# Patient Record
Sex: MALE | Race: WHITE | NOT HISPANIC OR LATINO | URBAN - METROPOLITAN AREA
[De-identification: names, ages, dates, MRNs, and addresses within clinical notes are randomized per-mention and may not be internally consistent; named-entity substitution may affect disease eponyms.]

---

## 2017-05-16 ENCOUNTER — OFFICE (OUTPATIENT)
Dept: URBAN - METROPOLITAN AREA CLINIC 75 | Facility: CLINIC | Age: 52
End: 2017-05-16

## 2017-05-16 VITALS
HEART RATE: 76 BPM | WEIGHT: 250 LBS | DIASTOLIC BLOOD PRESSURE: 68 MMHG | HEIGHT: 72 IN | SYSTOLIC BLOOD PRESSURE: 128 MMHG

## 2017-05-16 DIAGNOSIS — Z87.820 PERSONAL HISTORY OF TRAUMATIC BRAIN INJURY: ICD-10-CM

## 2017-05-16 DIAGNOSIS — Z86.010 PERSONAL HISTORY OF COLONIC POLYPS: ICD-10-CM

## 2017-05-16 PROCEDURE — 99213 OFFICE O/P EST LOW 20 MIN: CPT | Performed by: INTERNAL MEDICINE

## 2017-06-08 ENCOUNTER — ON CAMPUS - OUTPATIENT (OUTPATIENT)
Dept: URBAN - METROPOLITAN AREA HOSPITAL 108 | Facility: HOSPITAL | Age: 52
End: 2017-06-08

## 2017-06-08 DIAGNOSIS — K63.5 POLYP OF COLON: ICD-10-CM

## 2017-06-08 DIAGNOSIS — D12.3 BENIGN NEOPLASM OF TRANSVERSE COLON: ICD-10-CM

## 2017-06-08 DIAGNOSIS — K62.1 RECTAL POLYP: ICD-10-CM

## 2017-06-08 DIAGNOSIS — K64.8 OTHER HEMORRHOIDS: ICD-10-CM

## 2017-06-08 DIAGNOSIS — Z86.010 PERSONAL HISTORY OF COLONIC POLYPS: ICD-10-CM

## 2017-06-08 PROCEDURE — 45381 COLONOSCOPY SUBMUCOUS NJX: CPT | Mod: 59

## 2017-06-08 PROCEDURE — 45385 COLONOSCOPY W/LESION REMOVAL: CPT | Mod: PT

## 2017-06-28 ENCOUNTER — ON CAMPUS - OUTPATIENT (OUTPATIENT)
Dept: URBAN - METROPOLITAN AREA HOSPITAL 108 | Facility: HOSPITAL | Age: 52
End: 2017-06-28

## 2017-06-28 DIAGNOSIS — K62.1 RECTAL POLYP: ICD-10-CM

## 2017-06-28 DIAGNOSIS — D12.3 BENIGN NEOPLASM OF TRANSVERSE COLON: ICD-10-CM

## 2017-06-28 DIAGNOSIS — K63.5 POLYP OF COLON: ICD-10-CM

## 2017-06-28 DIAGNOSIS — Z86.010 PERSONAL HISTORY OF COLONIC POLYPS: ICD-10-CM

## 2017-06-28 PROCEDURE — 45385 COLONOSCOPY W/LESION REMOVAL: CPT | Mod: PT

## 2017-06-28 PROCEDURE — 45381 COLONOSCOPY SUBMUCOUS NJX: CPT

## 2017-08-21 ENCOUNTER — OFFICE VISIT (OUTPATIENT)
Dept: CARDIOLOGY | Facility: CLINIC | Age: 52
End: 2017-08-21

## 2017-08-21 VITALS
BODY MASS INDEX: 34.34 KG/M2 | WEIGHT: 253.2 LBS | SYSTOLIC BLOOD PRESSURE: 120 MMHG | HEART RATE: 80 BPM | DIASTOLIC BLOOD PRESSURE: 70 MMHG

## 2017-08-21 DIAGNOSIS — Z01.810 ENCOUNTER FOR PRE-OPERATIVE CARDIOVASCULAR CLEARANCE: Primary | ICD-10-CM

## 2017-08-21 DIAGNOSIS — I50.9 HEART FAILURE, UNSPECIFIED HEART FAILURE CHRONICITY, UNSPECIFIED HEART FAILURE TYPE: ICD-10-CM

## 2017-08-21 DIAGNOSIS — S06.9X9D: ICD-10-CM

## 2017-08-21 PROCEDURE — 93000 ELECTROCARDIOGRAM COMPLETE: CPT | Performed by: NURSE PRACTITIONER

## 2017-08-21 PROCEDURE — 99203 OFFICE O/P NEW LOW 30 MIN: CPT | Performed by: NURSE PRACTITIONER

## 2017-08-21 RX ORDER — IBUPROFEN 200 MG
400 TABLET ORAL 4 TIMES DAILY
COMMUNITY

## 2017-08-30 NOTE — PROGRESS NOTES
Date of Office Visit: 2017  Encounter Provider: ROBERT Lopez  Place of Service: [facility] CARDIOLOGY  Patient Name: Hayder Pozo  :1965    Chief Complaint   Patient presents with   • Pre-op Exam   :     HPI: Hayder Pozo is a 52 y.o. male who presents today for perioperative cardiac risk assessment. This visit is a consultation requested by Dr. Lobito Hwang. He has been diagnosed with colorectal cancer and has been recommended to undergo surgical intervention. He has a history of traumatic brain injury, encephalitis, and epilepsy. He has a history of cigarette smoking 1 ppd since childhood. He has heart failure listed in his past medical history and this is questionable because he has never seen a cardiologist. He is on furosemide for history of bilateral lower extremity edema. Upon review of his records, he had a lexiscan cardiolite stress test completed at [facility] on 2016 which showed the following: normal stress test with no clear evidence of ischemia or infarction, and normal resting left ventricular systolic function. He also had a proBNP level of 15.7 drawn in 2017 which was negative for heart failure.     Mr. Pozo presents today with two staff members from [facility]. He is in a wheelchair, poor historian, and appears agitated today. He denies chest pain, shortness of breath, paroxysmal nocturnal dyspnea, orthopnea, cough, wheezing, edema, palpitations, dizziness, syncopal episodes, and falls. According to the staff members, he has been on strict diet and lost 18 pounds in the past month.     Past Medical History:   Diagnosis Date   • Adrenal adenoma    • Ankle sprain    • Anxiety    • Atherosclerosis    • Balance problem    • Bilateral lower extremity edema    • CHF (congestive heart failure)    • Chronic bronchitis    • Closed fracture of ankle     lateral malleolus   • Colonic polyp    • Congenital pes  planovalgus     bilateral   • Constipation    • COPD (chronic obstructive pulmonary disease)    • Edema     persistant   • Elevated TSH    • Encephalitis    • Epilepsy    • Fracture of body of right scapula    • Head injury    • Hyperlipidemia    • Intractable epilepsy    • Left ankle sprain    • Left foot pain    • Metatarsal fracture    • MR (mental retardation)    • Onychomycosis    • Pes planus    • Planovalgus deformity of foot, acquired    • Recent weight gain    • Seizure    • Skin nodule    • Smoker    • Tubular adenoma    • Weight loss        Past Surgical History:   Procedure Laterality Date   • BRAIN SURGERY  1986    biopsy secondary to encephalitis   • COLONOSCOPY  04/28/2016   • POLYPECTOMY      via colostomy       Social History     Social History   • Marital status: Single     Spouse name: N/A   • Number of children: N/A   • Years of education: N/A     Occupational History   • Not on file.     Social History Main Topics   • Smoking status: Current Every Day Smoker     Packs/day: 1.00     Years: 30.00     Types: Cigarettes   • Smokeless tobacco: Not on file   • Alcohol use No   • Drug use: No   • Sexual activity: Not on file     Other Topics Concern   • Not on file     Social History Narrative       Family History   Problem Relation Age of Onset   • Adopted: Yes   • No Known Problems Mother    • No Known Problems Father        Review of Systems   Constitution: Positive for malaise/fatigue. Negative for chills, diaphoresis, fever, night sweats, weight gain and weight loss.   HENT: Negative for hearing loss, nosebleeds, sore throat and tinnitus.    Eyes: Negative for blurred vision, double vision, pain and visual disturbance.   Cardiovascular: Negative for chest pain, claudication, cyanosis, dyspnea on exertion, irregular heartbeat, leg swelling, near-syncope, orthopnea, palpitations, paroxysmal nocturnal dyspnea and syncope.   Respiratory: Negative for cough, hemoptysis, shortness of breath, snoring and  wheezing.    Endocrine: Negative for cold intolerance, heat intolerance and polyuria.   Hematologic/Lymphatic: Negative for bleeding problem. Does not bruise/bleed easily.   Skin: Negative for color change, dry skin, flushing and itching.   Musculoskeletal: Negative for falls, joint pain, joint swelling, muscle cramps, muscle weakness and myalgias.   Gastrointestinal: Negative for abdominal pain, constipation, heartburn, melena, nausea and vomiting.   Genitourinary: Negative for dysuria and hematuria.   Neurological: Positive for seizures. Negative for excessive daytime sleepiness, dizziness, light-headedness, loss of balance, numbness, paresthesias and vertigo.   Psychiatric/Behavioral: Negative for altered mental status, depression, memory loss and substance abuse. The patient does not have insomnia and is not nervous/anxious.    Allergic/Immunologic: Negative for environmental allergies.       No Known Allergies      Current Outpatient Prescriptions:   •  acetaminophen (TYLENOL) 325 MG tablet, Take 650 mg by mouth every 6 (six) hours as needed for mild pain (1-3)., Disp: , Rfl:   •  Bisacodyl (DULCOLAX PO), Take  by mouth As Needed., Disp: , Rfl:   •  budesonide-formoterol (SYMBICORT) 160-4.5 MCG/ACT inhaler, Inhale 2 puffs., Disp: , Rfl:   •  carBAMazepine XR (TEGretol  XR) 200 MG 12 hr tablet, Take 600 mg by mouth 4 (four) times a day., Disp: , Rfl:   •  chlorhexidine (PERIDEX) 0.12 % solution, Apply 15 mL to the mouth or throat 2 (two) times a day., Disp: , Rfl:   •  Cholecalciferol (VITAMELTS VITAMIN D) 1000 UNITS tablet dispersible, Take  by mouth Daily., Disp: , Rfl:   •  cloBAZam (ONFI) 20 MG tablet, Take 20 mg by mouth., Disp: , Rfl:   •  clonazePAM (KlonoPIN) 1 MG tablet, Take 1 mg by mouth 3 (Three) Times a Day As Needed for Seizures., Disp: , Rfl:   •  dextromethorphan-guaifenesin (ROBITUSSIN-DM)  MG/5ML syrup, Take 5 mL by mouth as needed., Disp: , Rfl:   •  doxepin (SINEquan) 100 MG capsule,  Take 50 mg by mouth Every Night., Disp: , Rfl:   •  econazole nitrate (SPECTAZOLE) 1 % cream, Apply  topically as needed (for rash on feet)., Disp: , Rfl:   •  furosemide (LASIX) 20 MG tablet, Take 40 mg by mouth Daily., Disp: , Rfl:   •  ibuprofen (ADVIL,MOTRIN) 200 MG tablet, Take 400 mg by mouth 4 (Four) Times a Day., Disp: , Rfl:   •  lacosamide (VIMPAT) 100 MG tablet tablet, Take 250 mg by mouth every 12 (twelve) hours., Disp: , Rfl:   •  lidocaine (LIDODERM) 5 %, Place 1 patch on the skin Daily. Remove & Discard patch within 12 hours or as directed by MD, Disp: , Rfl:   •  midazolam (VERSED) 5 MG/5ML solution injection, Infuse  into a venous catheter As Needed., Disp: , Rfl:   •  PHENobarbital (LUMINAL) 64.8 MG tablet, Take 64.8 mg by mouth every night., Disp: , Rfl:   •  phenytoin (DILANTIN) 100 MG ER capsule, Take 200 mg by mouth 3 (three) times a day., Disp: , Rfl:   •  potassium chloride (MICRO-K) 10 MEQ CR capsule, Take 20 mEq by mouth Daily., Disp: , Rfl:   •  QUEtiapine (SEROquel) 50 MG tablet, Take 50 mg by mouth Every Night., Disp: , Rfl:   •  risperiDONE (RisperDAL) 3 MG tablet, Take 3 mg by mouth 2 (two) times a day., Disp: , Rfl:   •  Sennosides (SENEXON PO), Take  by mouth. 2 po twice daily, Disp: , Rfl:   •  Simethicone 80 MG tablet, Take 80 mg by mouth As Needed., Disp: , Rfl:   •  Suvorexant (BELSOMRA) 20 MG tablet, Take 1 tablet by mouth At Night As Needed. At bedtime for insomnia, Disp: , Rfl:   •  tiotropium (SPIRIVA) 18 MCG per inhalation capsule, Place 1 capsule into inhaler and inhale., Disp: , Rfl:   •  traZODone (DESYREL) 50 MG tablet, Take 25 mg by mouth 4 (Four) Times a Day As Needed (agitaion)., Disp: , Rfl:   •  triamcinolone (KENALOG) 0.1 % cream, Apply  topically as needed (for rash)., Disp: , Rfl:      Objective:     Vitals:    08/21/17 1321   BP: 120/70   BP Location: Left arm   Pulse: 80   Weight: 253 lb 3.2 oz (115 kg)     Body mass index is 34.34 kg/(m^2).    PHYSICAL  EXAM:    Vitals Reviewed.   General Appearance: No acute distress, well developed and well nourished. Obese. In a wheelchair.   Eyes: Conjunctiva and lids: No erythema, swelling, or discharge. Sclera non-icteric.   HENT: Atraumatic, normocephalic. External eyes, ears, and nose normal. No hearing loss noted. Mucous membranes normal. Lips not cyanotic. Neck supple with no tenderness.  Respiratory: No signs of respiratory distress. Respiration rhythm and depth normal.   Clear to auscultation. No rales, crackles, rhonchi, or wheezing auscultated.   Cardiovascular:  Jugular Venous Pressure: Normal  Heart Rate and Rhythm: Normal, Heart Sounds: Normal S1 and S2. No S3 or S4 noted.  Murmurs: No murmurs noted. No rubs, thrills, or gallops.   Arterial Pulses: Carotid pulses normal. No carotid bruit noted. Posterior tibialis and dorsalis pedis pulses normal.   Lower Extremities: Bilateral lower extremity edema noted.  Gastrointestinal:  Abdomen soft, obese, non-distended, non-tender. Normal bowel sounds. No hepatomegaly.   Musculoskeletal: Normal movement of extremities  Skin and Nails: General appearance normal. No pallor, cyanosis, diaphoresis. Skin temperature normal. No clubbing of fingernails.   Psychiatric: Patient alert and oriented to person, place, and time. Speech and behavior appropriate. Normal mood and affect.       ECG 12 Lead  Date/Time: 8/21/2017 1:37 PM  Performed by: ARDEN KELLY  Authorized by: ARDEN KELLY   Comparison: compared with previous ECG from 12/14/2016  Similar to previous ECG  Rhythm: sinus rhythm  Rate: normal  BPM: 80  Conduction: LAFB  ST Segments: ST segments normal  T Waves: T waves normal  QRS axis: normal  Other: no other findings  Clinical impression: non-specific ECG              Assessment:       Diagnosis Plan   1. Encounter for pre-operative cardiovascular clearance     2. 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 Heart failure, unspecified heart failure chronicity, unspecified heart failure type            Plan:       Mr. Gordon presents today for perioperative cardiac risk assessment. Mr. Pozo denies anginal or heart failure symptoms. He had a normal nuclear stress test in 2016 which showed no evidence of ischemia, infarction, and normal ejection fraction. I discussed the plan of care with Dr. Corwin Richard. We do not feel that further cardiac testing would change his risk. We feel that he is at intermediate risk for any adverse cardiac events occurring with moderate risk surgery. If we can be of further assistance please do not hesitate to contact me at the office. Thank you for the referral.       As always, it has been a pleasure to participate in your patient's care.      Sincerely,         ROBERT Gillette

## 2017-10-06 ENCOUNTER — LAB REQUISITION (OUTPATIENT)
Dept: LAB | Facility: HOSPITAL | Age: 52
End: 2017-10-06

## 2017-10-06 DIAGNOSIS — Z00.00 ROUTINE GENERAL MEDICAL EXAMINATION AT A HEALTH CARE FACILITY: ICD-10-CM

## 2017-10-06 LAB
ALBUMIN SERPL-MCNC: 4.3 G/DL (ref 3.5–5.2)
ALBUMIN/GLOB SERPL: 1.6 G/DL
ALP SERPL-CCNC: 37 U/L (ref 39–117)
ALT SERPL W P-5'-P-CCNC: 19 U/L (ref 1–41)
ANION GAP SERPL CALCULATED.3IONS-SCNC: 15 MMOL/L
AST SERPL-CCNC: 19 U/L (ref 1–40)
BASOPHILS # BLD AUTO: 0.04 10*3/MM3 (ref 0–0.2)
BASOPHILS NFR BLD AUTO: 0.7 % (ref 0–1.5)
BILIRUB SERPL-MCNC: <0.2 MG/DL (ref 0.1–1.2)
BUN BLD-MCNC: 10 MG/DL (ref 6–20)
BUN/CREAT SERPL: 12.2 (ref 7–25)
CALCIUM SPEC-SCNC: 9.1 MG/DL (ref 8.6–10.5)
CHLORIDE SERPL-SCNC: 98 MMOL/L (ref 98–107)
CO2 SERPL-SCNC: 25 MMOL/L (ref 22–29)
CREAT BLD-MCNC: 0.82 MG/DL (ref 0.76–1.27)
DEPRECATED RDW RBC AUTO: 53.1 FL (ref 37–54)
EOSINOPHIL # BLD AUTO: 0.25 10*3/MM3 (ref 0–0.7)
EOSINOPHIL NFR BLD AUTO: 4.1 % (ref 0.3–6.2)
ERYTHROCYTE [DISTWIDTH] IN BLOOD BY AUTOMATED COUNT: 14.5 % (ref 11.5–14.5)
GFR SERPL CREATININE-BSD FRML MDRD: 98 ML/MIN/1.73
GLOBULIN UR ELPH-MCNC: 2.7 GM/DL
GLUCOSE BLD-MCNC: 117 MG/DL (ref 65–99)
HCT VFR BLD AUTO: 38.6 % (ref 40.4–52.2)
HGB BLD-MCNC: 12.5 G/DL (ref 13.7–17.6)
IMM GRANULOCYTES # BLD: 0 10*3/MM3 (ref 0–0.03)
IMM GRANULOCYTES NFR BLD: 0 % (ref 0–0.5)
LYMPHOCYTES # BLD AUTO: 2.17 10*3/MM3 (ref 0.9–4.8)
LYMPHOCYTES NFR BLD AUTO: 36 % (ref 19.6–45.3)
MCH RBC QN AUTO: 32.5 PG (ref 27–32.7)
MCHC RBC AUTO-ENTMCNC: 32.4 G/DL (ref 32.6–36.4)
MCV RBC AUTO: 100.3 FL (ref 79.8–96.2)
MONOCYTES # BLD AUTO: 0.44 10*3/MM3 (ref 0.2–1.2)
MONOCYTES NFR BLD AUTO: 7.3 % (ref 5–12)
NEUTROPHILS # BLD AUTO: 3.13 10*3/MM3 (ref 1.9–8.1)
NEUTROPHILS NFR BLD AUTO: 51.9 % (ref 42.7–76)
NT-PROBNP SERPL-MCNC: 53.7 PG/ML (ref 0–900)
PLATELET # BLD AUTO: 351 10*3/MM3 (ref 140–500)
PMV BLD AUTO: 10 FL (ref 6–12)
POTASSIUM BLD-SCNC: 4.7 MMOL/L (ref 3.5–5.2)
PROT SERPL-MCNC: 7 G/DL (ref 6–8.5)
RBC # BLD AUTO: 3.85 10*6/MM3 (ref 4.6–6)
SODIUM BLD-SCNC: 138 MMOL/L (ref 136–145)
WBC NRBC COR # BLD: 6.03 10*3/MM3 (ref 4.5–10.7)

## 2017-10-06 PROCEDURE — 85025 COMPLETE CBC W/AUTO DIFF WBC: CPT

## 2017-10-06 PROCEDURE — 83880 ASSAY OF NATRIURETIC PEPTIDE: CPT

## 2017-10-06 PROCEDURE — 80053 COMPREHEN METABOLIC PANEL: CPT

## 2017-12-15 ENCOUNTER — LAB REQUISITION (OUTPATIENT)
Dept: LAB | Facility: HOSPITAL | Age: 52
End: 2017-12-15

## 2017-12-15 DIAGNOSIS — D64.9 ANEMIA: ICD-10-CM

## 2017-12-15 LAB
ALBUMIN SERPL-MCNC: 4.3 G/DL (ref 3.5–5.2)
ALBUMIN/GLOB SERPL: 1.5 G/DL
ALP SERPL-CCNC: 44 U/L (ref 39–117)
ALT SERPL W P-5'-P-CCNC: 15 U/L (ref 1–41)
ANION GAP SERPL CALCULATED.3IONS-SCNC: 15.8 MMOL/L
AST SERPL-CCNC: 17 U/L (ref 1–40)
BASOPHILS # BLD AUTO: 0.04 10*3/MM3 (ref 0–0.2)
BASOPHILS NFR BLD AUTO: 0.7 % (ref 0–1.5)
BILIRUB SERPL-MCNC: <0.2 MG/DL (ref 0.1–1.2)
BUN BLD-MCNC: 16 MG/DL (ref 6–20)
BUN/CREAT SERPL: 19.8 (ref 7–25)
CALCIUM SPEC-SCNC: 9.4 MG/DL (ref 8.6–10.5)
CHLORIDE SERPL-SCNC: 103 MMOL/L (ref 98–107)
CO2 SERPL-SCNC: 26.2 MMOL/L (ref 22–29)
CREAT BLD-MCNC: 0.81 MG/DL (ref 0.76–1.27)
DEPRECATED RDW RBC AUTO: 53.7 FL (ref 37–54)
EOSINOPHIL # BLD AUTO: 0.28 10*3/MM3 (ref 0–0.7)
EOSINOPHIL NFR BLD AUTO: 4.8 % (ref 0.3–6.2)
ERYTHROCYTE [DISTWIDTH] IN BLOOD BY AUTOMATED COUNT: 14.3 % (ref 11.5–14.5)
GFR SERPL CREATININE-BSD FRML MDRD: 99 ML/MIN/1.73
GLOBULIN UR ELPH-MCNC: 2.9 GM/DL
GLUCOSE BLD-MCNC: 112 MG/DL (ref 65–99)
HCT VFR BLD AUTO: 40.5 % (ref 40.4–52.2)
HGB BLD-MCNC: 12.8 G/DL (ref 13.7–17.6)
IMM GRANULOCYTES # BLD: 0 10*3/MM3 (ref 0–0.03)
IMM GRANULOCYTES NFR BLD: 0 % (ref 0–0.5)
LYMPHOCYTES # BLD AUTO: 1.8 10*3/MM3 (ref 0.9–4.8)
LYMPHOCYTES NFR BLD AUTO: 30.7 % (ref 19.6–45.3)
MCH RBC QN AUTO: 32.1 PG (ref 27–32.7)
MCHC RBC AUTO-ENTMCNC: 31.6 G/DL (ref 32.6–36.4)
MCV RBC AUTO: 101.5 FL (ref 79.8–96.2)
MONOCYTES # BLD AUTO: 0.6 10*3/MM3 (ref 0.2–1.2)
MONOCYTES NFR BLD AUTO: 10.2 % (ref 5–12)
NEUTROPHILS # BLD AUTO: 3.14 10*3/MM3 (ref 1.9–8.1)
NEUTROPHILS NFR BLD AUTO: 53.6 % (ref 42.7–76)
PLATELET # BLD AUTO: 357 10*3/MM3 (ref 140–500)
PMV BLD AUTO: 9.7 FL (ref 6–12)
POTASSIUM BLD-SCNC: 4.5 MMOL/L (ref 3.5–5.2)
PROT SERPL-MCNC: 7.2 G/DL (ref 6–8.5)
RBC # BLD AUTO: 3.99 10*6/MM3 (ref 4.6–6)
SODIUM BLD-SCNC: 145 MMOL/L (ref 136–145)
WBC NRBC COR # BLD: 5.86 10*3/MM3 (ref 4.5–10.7)

## 2017-12-15 PROCEDURE — 85025 COMPLETE CBC W/AUTO DIFF WBC: CPT

## 2017-12-15 PROCEDURE — 80053 COMPREHEN METABOLIC PANEL: CPT

## 2017-12-18 ENCOUNTER — LAB REQUISITION (OUTPATIENT)
Dept: LAB | Facility: HOSPITAL | Age: 52
End: 2017-12-18

## 2017-12-18 DIAGNOSIS — R68.89 OTHER GENERAL SYMPTOMS AND SIGNS: ICD-10-CM

## 2017-12-18 LAB — PHENYTOIN SERPL-MCNC: 18.5 MCG/ML (ref 10–20)

## 2017-12-18 PROCEDURE — 80185 ASSAY OF PHENYTOIN TOTAL: CPT

## 2018-01-11 ENCOUNTER — LAB REQUISITION (OUTPATIENT)
Dept: LAB | Facility: HOSPITAL | Age: 53
End: 2018-01-11

## 2018-01-11 DIAGNOSIS — Z00.00 ROUTINE GENERAL MEDICAL EXAMINATION AT A HEALTH CARE FACILITY: ICD-10-CM

## 2018-01-11 LAB — PHENYTOIN SERPL-MCNC: 32.9 MCG/ML (ref 10–20)

## 2018-01-11 PROCEDURE — 80185 ASSAY OF PHENYTOIN TOTAL: CPT

## 2018-01-12 ENCOUNTER — LAB REQUISITION (OUTPATIENT)
Dept: LAB | Facility: HOSPITAL | Age: 53
End: 2018-01-12

## 2018-01-12 DIAGNOSIS — Z00.00 ROUTINE GENERAL MEDICAL EXAMINATION AT A HEALTH CARE FACILITY: ICD-10-CM

## 2018-01-12 LAB — PHENYTOIN SERPL-MCNC: 30.4 MCG/ML (ref 10–20)

## 2018-01-12 PROCEDURE — 80185 ASSAY OF PHENYTOIN TOTAL: CPT

## 2018-01-29 ENCOUNTER — LAB REQUISITION (OUTPATIENT)
Dept: LAB | Facility: HOSPITAL | Age: 53
End: 2018-01-29

## 2018-01-29 DIAGNOSIS — Z79.899 OTHER LONG TERM (CURRENT) DRUG THERAPY: ICD-10-CM

## 2018-01-29 LAB — PHENYTOIN SERPL-MCNC: 9 MCG/ML (ref 10–20)

## 2018-01-29 PROCEDURE — 80185 ASSAY OF PHENYTOIN TOTAL: CPT

## 2018-03-10 ENCOUNTER — LAB REQUISITION (OUTPATIENT)
Dept: LAB | Facility: HOSPITAL | Age: 53
End: 2018-03-10

## 2018-03-10 DIAGNOSIS — Z00.00 ROUTINE GENERAL MEDICAL EXAMINATION AT A HEALTH CARE FACILITY: ICD-10-CM

## 2018-03-10 LAB — PHENYTOIN SERPL-MCNC: 18.3 MCG/ML (ref 10–20)

## 2018-03-10 PROCEDURE — 80185 ASSAY OF PHENYTOIN TOTAL: CPT

## 2018-08-15 ENCOUNTER — LAB REQUISITION (OUTPATIENT)
Dept: LAB | Facility: HOSPITAL | Age: 53
End: 2018-08-15

## 2018-08-15 DIAGNOSIS — Z00.00 ROUTINE GENERAL MEDICAL EXAMINATION AT A HEALTH CARE FACILITY: ICD-10-CM

## 2018-08-15 LAB
ALBUMIN SERPL-MCNC: 4.5 G/DL (ref 3.5–5.2)
ALBUMIN/GLOB SERPL: 1.9 G/DL
ALP SERPL-CCNC: 45 U/L (ref 39–117)
ALT SERPL W P-5'-P-CCNC: 59 U/L (ref 1–41)
ANION GAP SERPL CALCULATED.3IONS-SCNC: 14.4 MMOL/L
AST SERPL-CCNC: 258 U/L (ref 1–40)
BASOPHILS # BLD AUTO: 0.02 10*3/MM3 (ref 0–0.2)
BASOPHILS NFR BLD AUTO: 0.2 % (ref 0–1.5)
BILIRUB SERPL-MCNC: 0.5 MG/DL (ref 0.1–1.2)
BUN BLD-MCNC: 14 MG/DL (ref 6–20)
BUN/CREAT SERPL: 13.1 (ref 7–25)
CALCIUM SPEC-SCNC: 8.9 MG/DL (ref 8.6–10.5)
CHLORIDE SERPL-SCNC: 99 MMOL/L (ref 98–107)
CO2 SERPL-SCNC: 27.6 MMOL/L (ref 22–29)
CREAT BLD-MCNC: 1.07 MG/DL (ref 0.76–1.27)
DEPRECATED RDW RBC AUTO: 51 FL (ref 37–54)
EOSINOPHIL # BLD AUTO: 0.03 10*3/MM3 (ref 0–0.7)
EOSINOPHIL NFR BLD AUTO: 0.3 % (ref 0.3–6.2)
ERYTHROCYTE [DISTWIDTH] IN BLOOD BY AUTOMATED COUNT: 13.9 % (ref 11.5–14.5)
GFR SERPL CREATININE-BSD FRML MDRD: 72 ML/MIN/1.73
GLOBULIN UR ELPH-MCNC: 2.4 GM/DL
GLUCOSE BLD-MCNC: 113 MG/DL (ref 65–99)
HCT VFR BLD AUTO: 39 % (ref 40.4–52.2)
HGB BLD-MCNC: 12.2 G/DL (ref 13.7–17.6)
IMM GRANULOCYTES # BLD: 0 10*3/MM3 (ref 0–0.03)
IMM GRANULOCYTES NFR BLD: 0 % (ref 0–0.5)
LYMPHOCYTES # BLD AUTO: 1.5 10*3/MM3 (ref 0.9–4.8)
LYMPHOCYTES NFR BLD AUTO: 14.5 % (ref 19.6–45.3)
MCH RBC QN AUTO: 31 PG (ref 27–32.7)
MCHC RBC AUTO-ENTMCNC: 31.3 G/DL (ref 32.6–36.4)
MCV RBC AUTO: 99 FL (ref 79.8–96.2)
MONOCYTES # BLD AUTO: 1.32 10*3/MM3 (ref 0.2–1.2)
MONOCYTES NFR BLD AUTO: 12.7 % (ref 5–12)
NEUTROPHILS # BLD AUTO: 7.51 10*3/MM3 (ref 1.9–8.1)
NEUTROPHILS NFR BLD AUTO: 72.3 % (ref 42.7–76)
PLATELET # BLD AUTO: 314 10*3/MM3 (ref 140–500)
PMV BLD AUTO: 9.2 FL (ref 6–12)
POTASSIUM BLD-SCNC: 4.9 MMOL/L (ref 3.5–5.2)
PROT SERPL-MCNC: 6.9 G/DL (ref 6–8.5)
RBC # BLD AUTO: 3.94 10*6/MM3 (ref 4.6–6)
SODIUM BLD-SCNC: 141 MMOL/L (ref 136–145)
WBC NRBC COR # BLD: 10.38 10*3/MM3 (ref 4.5–10.7)

## 2018-08-15 PROCEDURE — 80053 COMPREHEN METABOLIC PANEL: CPT

## 2018-08-15 PROCEDURE — 85025 COMPLETE CBC W/AUTO DIFF WBC: CPT

## 2018-08-30 ENCOUNTER — LAB REQUISITION (OUTPATIENT)
Dept: LAB | Facility: HOSPITAL | Age: 53
End: 2018-08-30

## 2018-08-30 DIAGNOSIS — Z00.00 ROUTINE GENERAL MEDICAL EXAMINATION AT A HEALTH CARE FACILITY: ICD-10-CM

## 2018-08-30 LAB
PHENOBARB SERPL-MCNC: 13 MCG/ML (ref 10–30)
PHENYTOIN SERPL-MCNC: 3.1 MCG/ML (ref 10–20)

## 2018-08-30 PROCEDURE — 80184 ASSAY OF PHENOBARBITAL: CPT

## 2018-08-30 PROCEDURE — 80185 ASSAY OF PHENYTOIN TOTAL: CPT

## 2020-08-27 ENCOUNTER — LAB REQUISITION (OUTPATIENT)
Dept: LAB | Facility: HOSPITAL | Age: 55
End: 2020-08-27

## 2020-08-27 DIAGNOSIS — Z00.00 ROUTINE GENERAL MEDICAL EXAMINATION AT A HEALTH CARE FACILITY: ICD-10-CM
